# Patient Record
Sex: FEMALE | Race: OTHER
[De-identification: names, ages, dates, MRNs, and addresses within clinical notes are randomized per-mention and may not be internally consistent; named-entity substitution may affect disease eponyms.]

---

## 2018-10-07 ENCOUNTER — HOSPITAL ENCOUNTER (EMERGENCY)
Dept: HOSPITAL 87 - ER | Age: 33
Discharge: LEFT BEFORE DISCHARGE | End: 2018-10-07
Payer: SELF-PAY

## 2018-10-07 ENCOUNTER — HOSPITAL ENCOUNTER (EMERGENCY)
Dept: HOSPITAL 72 - EMR | Age: 33
Discharge: HOME | End: 2018-10-07
Payer: SELF-PAY

## 2018-10-07 VITALS — DIASTOLIC BLOOD PRESSURE: 73 MMHG | SYSTOLIC BLOOD PRESSURE: 114 MMHG

## 2018-10-07 VITALS — WEIGHT: 198.64 LBS | HEIGHT: 64 IN | BODY MASS INDEX: 33.91 KG/M2

## 2018-10-07 VITALS — HEIGHT: 64 IN | WEIGHT: 200 LBS | BODY MASS INDEX: 34.15 KG/M2

## 2018-10-07 VITALS — DIASTOLIC BLOOD PRESSURE: 70 MMHG | SYSTOLIC BLOOD PRESSURE: 138 MMHG
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
## 2018-10-07 DIAGNOSIS — S66.821A: ICD-10-CM

## 2018-10-07 DIAGNOSIS — Z23: ICD-10-CM
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
## 2018-10-07 DIAGNOSIS — S61.511A: Primary | ICD-10-CM
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
## 2018-10-07 PROCEDURE — 96365 THER/PROPH/DIAG IV INF INIT: CPT

## 2018-10-07 PROCEDURE — 13121 CMPLX RPR S/A/L 2.6-7.5 CM: CPT

## 2018-10-07 PROCEDURE — 90715 TDAP VACCINE 7 YRS/> IM: CPT

## 2018-10-07 PROCEDURE — 25260 REPAIR FOREARM TENDON/MUSCLE: CPT

## 2018-10-07 PROCEDURE — 73110 X-RAY EXAM OF WRIST: CPT

## 2018-10-07 PROCEDURE — 90471 IMMUNIZATION ADMIN: CPT

## 2018-10-07 PROCEDURE — 99284 EMERGENCY DEPT VISIT MOD MDM: CPT

## 2018-10-07 NOTE — EMERGENCY ROOM REPORT
History of Present Illness


General


Chief Complaint:  Laceration


Source:  Patient


 (SEA ZELAYA)





Present Illness


HPI


The patient is a 33-year-old female presenting for right wrist injury 

approximately earlier today.  She states that she was hitting mosquitoes on her 

window when the window broke and a piece of glass cut her wrist.  Pain is now a 

6 out of 10 dull ache and does not radiate.  Worse with touch.  She denies any 

heavy bleeding.  She denies any other injury.


She denies any other symptoms.


She denies intent of self-harm





Last tetanus shot is unknown 


 (SEA ZELAYA)


Allergies:  


Coded Allergies:  


     ERYTHROMYCIN BASE (Verified  Allergy, Unknown, 10/7/18)





Patient History


Past Medical History:  see triage record


Pertinent Family History:  none


Pregnant Now:  No


Reviewed Nursing Documentation:  PMH: Agreed; PSxH: Agreed (SEA ZELAYA)





Nursing Documentation-PMH


Past Medical History:  No Stated History


 (SEA ZELAYA)





Review of Systems


All Other Systems:  negative except mentioned in HPI


 (SEA ZELAYA)





Physical Exam





Vital Signs








  Date Time  Temp Pulse Resp B/P (MAP) Pulse Ox O2 Delivery O2 Flow Rate FiO2


 


10/7/18 18:00 98.7 87 17 119/76 99 Room Air  





 98.8       








Sp02 EP Interpretation:  reviewed, normal


General Appearance:  no apparent distress, alert, GCS 15, non-toxic


Head:  normocephalic, atraumatic


Musculoskeletal:  normal range of motion - R wrist and fingers, tender - R 

anterior wrist


Neurologic:  alert, oriented x3, responsive, motor strength/tone normal, 

sensory intact, speech normal


Psychiatric:  judgement/insight normal, memory normal, mood/affect normal, no 

suicidal/homicidal ideation


Skin:  laceration - Aprox 4cm linear laceration of the R volar surface of 

wrist. medial tendon is visible and aprox 70-80% lacerated


 (SEA ZELAYA)





Procedures


Laceration/Wound Repair


Laceration/Wound Repair :  


   Consent:  Written - Procedure done by Dr. Sandra Gonzalez. Please see his note. 

Tendon repair and skin closure.


   Wound Location:  upper extremity - R wrist


 (SEA ZELAYA)





Medical Decision Making


PA Attestation


Dr. Villanueva is my supervising physician. Patient management was discussed with 

my supervising physician


 (SEA ZELAYA)


Diagnostic Impression:  


 Primary Impression:  


 Tendon laceration


ER Course


The patient is a 33-year-old female presenting for right wrist injury 

approximately earlier today





Ddx considered include but not limited to fracture, tendon/ligament injury, 

avulsion, nerve damage





PE: NAD


Aprox 4cm linear laceration of the R volar surface of wrist. medial tendon is 

visible and aprox 70-80% lacerated 


Full AROM of wrist and fingers intact. SILT. Radial pulse 2+ 





The wound is thoroughly cleaned with normal saline and Betadine.


Lidocaine 1% with epi was used for local anesthesia.  IV Ancef was started





Dr. Sandra Gonzalez was consulted and arrived at the emergency department shortly 

after.  He obtained the patient's consent and repaired the tendon and closed 

the skin laceration.  Please see his note for full details.  He told the 

patient to follow up with in his office.


Office information is provided at time of discharge





The patient is given prescription for Keflex and pain medication. ER 

precautions given 


 (SEA ZELAYA)


ER Course


Patient examined by me.


Palmaris longus laceration with R wrist laceration.


I agree with treatment plan.


 (Nicholas Villanueva M.D.)





Other X-Ray Diagnostic Results


Other X-Ray Diagnostic Results :  


   X-Ray ordered:  R wrist


   # of Views/Limited Vs Complete:  3 View


   Indication:  Pain


   EP Interpretation:  Yes


   PA Xray:  Interpretation reviewed, by supervising MD, and agrees with 

findings.


   Interpretation:  no dislocation, no soft tissue swelling, no fractures


   Impression:  Other - no FB


   Electronically Signed by:  Sea Zelaya PA-C


 (SEA ZELAYA PQuangAQuang)





Last Vital Signs








  Date Time  Temp Pulse Resp B/P (MAP) Pulse Ox O2 Delivery O2 Flow Rate FiO2


 


10/7/18 20:06 98.7 75 17 114/73 99 Room Air  





 209.7       








Status:  improved


 (SEA ZELAYA)


Disposition:  HOME, SELF-CARE


Condition:  Improved


Scripts


Ibuprofen* (MOTRIN*) 600 Mg Tablet


600 MG ORAL Q8H PRN for For Pain, #30 TAB 0 Refills


   Prov: SEA ZELAYA         10/7/18 


Hydrocodone Bit/Acetaminophen 5-325* (NORCO 5-325*) 1 Each Tablet


1 TAB ORAL Q6H PRN for For Pain, #8 TAB 0 Refills


   Prov: SEA ZELAYA.A.         10/7/18 


Cephalexin* (KEFLEX*) 500 Mg Capsule


500 MG ORAL EVERY 6 HOURS, #28 CAP


   Prov: SEA ZELAYA.A.         10/7/18


Referrals:  


SANDRA GONZALEZ M.D.


Patient Instructions:  Laceration Care, Adult, Tendon Injury





Additional Instructions:  


Dr. Sandra Gonzalez has instructed you to make an appointment with him in his office. 

Information has been provided. 





No heavy lifting. 





Take the medications as prescribed.





Return to emergency Department if you notice numbness, wound discharge, redness 

around the wound, increased pain, or any other symptoms











SEA ZELAYA Oct 7, 2018 20:28


Nicholas Villanueva M.D. Oct 8, 2018 23:29

## 2018-10-08 NOTE — DIAGNOSTIC IMAGING REPORT
Indication: Right wrist pain

 

Findings: 3  views of the right wrist were obtained. 

 

No acute fractures, malalignment, erosions or periostitis are identified. Soft

tissues are unremarkable.

 

Impression: No acute findings.

## 2018-11-06 NOTE — CONSULTATION
DATE OF CONSULTATION:  10/17/2018

PREOPERATIVE DIAGNOSIS/REASON FOR CONSULTATION:  Right wrist laceration and

transection of the _____.



HISTORY OF PRESENT ILLNESS:  The patient is a 33-year-old female, who

sustained a right wrist injury earlier today.  She was hitting mosquitoes

and hit a window trying to kill mosquito and the window broke.  The glass

cut her wrist.  There was profuse bleeding for which she applied pressure

and then came to the ER at [facility] [place] for evaluation and

treatment.



PAST MEDICAL HISTORY:  None.



PAST SURGICAL HISTORY:  None.



ALLERGIES TO MEDICATIONS:  None.



CURRENT MEDICATIONS:  None.



REVIEW OF SYSTEMS:  Negative except as noted in the history of present

illness.



PHYSICAL EXAM:

VITAL SIGNS:  Temperature 98.7, pulse 87, respiratory rate 17, blood

pressure 119/76, and pulse ox 99 on room air.

HEENT:  Head is normocephalic, atraumatic.  No gross deformities.  No scalp

lacerations.  Pupils are equal, round, and reactive to light.  Extraocular

motion is intact and symmetrical bilaterally.  External nose, eyes, ears,

not all appear normal.

NECK:  Supple.  No jugular venous distention.  No palpable masses.  No

bruits.

CHEST:  Clear to auscultation.  No wheezes, rales, or crackles.

CARDIOVASCULAR:  Normal sinus rhythm.  Normal S1, S2.  No murmurs, rubs, or

gallops.

ABDOMEN:  Soft, nontender, and nondistended.  No guarding, rebound, or

rigidity.

EXTREMITIES:  Full range of motion.  No gross deformities except for the

right wrist.  The right volar wrist has approximately 4 cm laceration

transecting the palmaris longus tendon, which is completely transected.

No other vessel or tendon injury visible _____ wound.



ASSESSMENT AND PLAN:  The patient is a 33-year-old female with transection

of the palmaris longus tendon as well as having a 4 cm laceration across

the volar wrist crease.  The risks, benefits, and alternatives were

discussed with her regarding her condition and surgery to repair her

palmaris longus and her volar wrist laceration.  She understands and

agrees to proceed with repair.  Arrangements will be made.









  ______________________________________________

  Kenji Gonzalez M.D.





DR:  SKIP

D:  11/06/2018 16:21

T:  11/06/2018 22:20

JOB#:  [id number]

CC:

## 2018-11-07 NOTE — OPERATIVE NOTE - DICTATED
DATE OF OPERATION:  10/07/2018

PREOPERATIVE DIAGNOSIS:  Right volar wrist laceration with complete

transection of the palmaris longus tendon.



POSTOPERATIVE DIAGNOSIS:  Right volar wrist laceration with complete

transection of the palmaris longus tendon.



PROCEDURE:  Repair of 4 cm laceration of the right volar wrist with

debridement and repair of palmaris longus tendon.



SURGEON:  Kenji Gonzalez M.D.



ASSISTANT:  None.



ANESTHESIA:  1% lidocaine with epinephrine and local regional

block.



COMPLICATIONS:  None.



SPECIMENS:  None.



FINDINGS DURING THIS PROCEDURE:  A 4 cm volar wrist laceration with

complete transection of the palmaris longus tendon.  No other visible

tendon or nerve injury noted.



OPERATIVE PROCEDURE NOTE IN DETAIL:  The patient was prepped and draped in

the usual sterile fashion.  1% lidocaine with epinephrine was used to

locally anesthetize the area around the injury in a field block.  The

wound was copiously irrigated using normal saline.  The wound was

debrided, and then the flaps were raised proximally and distally to locate

and expose the severed ends of palmaris longus tendon.  The tendon was

brought to length, and the ends of the tendon was freshened up using

closed lithotomy scissors.  The rest of the wound was explored, and there

appears to be no other injured structures.  Palmaris longus tendon was

then repaired using multiple figure-of-eight sutures using 4-0 M[place]il[place]

interrupted figure-of-eight sutures.  After repairing the palmaris longus

tendon, the laceration was then repaired in there using 4-0 Vicryl

interrupted deep dermal suture as well as fascial suture and then 3-0

nylon interrupted vertical mattress sutures.  Antibiotic ointment and

Xeroform dressing were applied over the suture line, and the patient was

placed in a forearm splint.  The patient tolerated the procedure well

without any problems.  The patient will be discharged home and will follow

up with me in my office.









  ______________________________________________

  Kenji Gonzalez M.D.





DR:  SKIP

D:  11/06/2018 16:26

T:  11/07/2018 02:39

JOB#:  [id number]

CC: